# Patient Record
Sex: FEMALE | Race: BLACK OR AFRICAN AMERICAN
[De-identification: names, ages, dates, MRNs, and addresses within clinical notes are randomized per-mention and may not be internally consistent; named-entity substitution may affect disease eponyms.]

---

## 2017-12-15 ENCOUNTER — HOSPITAL ENCOUNTER (EMERGENCY)
Dept: HOSPITAL 72 - EMR | Age: 62
Discharge: HOME | End: 2017-12-15
Payer: MEDICARE

## 2017-12-15 VITALS — WEIGHT: 200 LBS | BODY MASS INDEX: 34.15 KG/M2 | HEIGHT: 64 IN

## 2017-12-15 VITALS — DIASTOLIC BLOOD PRESSURE: 65 MMHG | SYSTOLIC BLOOD PRESSURE: 133 MMHG

## 2017-12-15 VITALS — SYSTOLIC BLOOD PRESSURE: 133 MMHG | DIASTOLIC BLOOD PRESSURE: 65 MMHG

## 2017-12-15 DIAGNOSIS — R91.8: ICD-10-CM

## 2017-12-15 DIAGNOSIS — J44.9: ICD-10-CM

## 2017-12-15 DIAGNOSIS — I10: ICD-10-CM

## 2017-12-15 DIAGNOSIS — E03.9: ICD-10-CM

## 2017-12-15 DIAGNOSIS — R07.89: Primary | ICD-10-CM

## 2017-12-15 LAB
ALBUMIN/GLOB SERPL: 0.9 {RATIO} (ref 1–2.7)
ALT SERPL-CCNC: 25 U/L (ref 12–78)
ANION GAP SERPL CALC-SCNC: 5 MMOL/L (ref 5–15)
AST SERPL-CCNC: 22 U/L (ref 15–37)
BASOPHILS NFR BLD AUTO: 0.8 % (ref 0–2)
CALCIUM SERPL-MCNC: 9 MG/DL (ref 8.5–10.1)
CHLORIDE SERPL-SCNC: 102 MMOL/L (ref 98–107)
CK MB SERPL-MCNC: 1.5 NG/ML (ref 0–3.6)
CO2 SERPL-SCNC: 32 MMOL/L (ref 21–32)
CREAT SERPL-MCNC: 1 MG/DL (ref 0.55–1.3)
EOSINOPHIL NFR BLD AUTO: 1.9 % (ref 0–3)
ERYTHROCYTE [DISTWIDTH] IN BLOOD BY AUTOMATED COUNT: 13.6 % (ref 11.6–14.8)
GFR SERPLBLD BASED ON 1.73 SQ M-ARVRAT: > 60 ML/MIN (ref 60–?)
GLOBULIN SER-MCNC: 4.1 G/DL
LYMPHOCYTES NFR BLD AUTO: 19.7 % (ref 20–45)
MCH RBC QN AUTO: 25.1 PG (ref 27–31)
MCHC RBC AUTO-ENTMCNC: 30.6 G/DL (ref 32–36)
MCV RBC AUTO: 82 FL (ref 80–99)
MONOCYTES NFR BLD AUTO: 4.7 % (ref 1–10)
NEUTROPHILS NFR BLD AUTO: 73 % (ref 45–75)
PLATELET # BLD: 212 K/UL (ref 150–450)
PMV BLD AUTO: 7.3 FL (ref 6.5–10.1)
POTASSIUM SERPL-SCNC: 2.9 MMOL/L (ref 3.5–5.1)
PROT SERPL-MCNC: 7.6 G/DL (ref 6.4–8.2)
RBC # BLD AUTO: 4.51 M/UL (ref 4.2–5.4)
SODIUM SERPL-SCNC: 139 MMOL/L (ref 136–145)
WBC # BLD AUTO: 6.7 K/UL (ref 4.8–10.8)

## 2017-12-15 PROCEDURE — 71275 CT ANGIOGRAPHY CHEST: CPT

## 2017-12-15 PROCEDURE — 82553 CREATINE MB FRACTION: CPT

## 2017-12-15 PROCEDURE — 84484 ASSAY OF TROPONIN QUANT: CPT

## 2017-12-15 PROCEDURE — 82550 ASSAY OF CK (CPK): CPT

## 2017-12-15 PROCEDURE — 99284 EMERGENCY DEPT VISIT MOD MDM: CPT

## 2017-12-15 PROCEDURE — 36415 COLL VENOUS BLD VENIPUNCTURE: CPT

## 2017-12-15 PROCEDURE — 85025 COMPLETE CBC W/AUTO DIFF WBC: CPT

## 2017-12-15 PROCEDURE — 83880 ASSAY OF NATRIURETIC PEPTIDE: CPT

## 2017-12-15 PROCEDURE — 93005 ELECTROCARDIOGRAM TRACING: CPT

## 2017-12-15 PROCEDURE — 80053 COMPREHEN METABOLIC PANEL: CPT

## 2017-12-15 PROCEDURE — 71010: CPT

## 2017-12-15 PROCEDURE — 85379 FIBRIN DEGRADATION QUANT: CPT

## 2017-12-16 NOTE — DIAGNOSTIC IMAGING REPORT
Indication: Shortness of breath

 

Technique: XRAY Chest 1v

 

Comparison: None

 

Findings: Borderline cardiomegaly. Mediastinal contours are sharp. There is no focal

consolidation, pneumothorax or pleural effusion. Osseous structures demonstrate no

acute abnormality. Degenerative changes are seen in the thoracic spine.

 

Impression:

Borderline cardiomegaly. No radiographic evidence of acute cardiopulmonary disease.

## 2017-12-16 NOTE — DIAGNOSTIC IMAGING REPORT
Indication: Shortness of breath

 

Technique: CTA Chest w Contrast. CT angiogram of the chest was obtained utilizing

automated exposure control after bolus initiation of IV contrast. Axial, coronal and

sagittal reformats obtained. 3-D reconstructions were created on a stand-alone

workstation and submitted to PACS.

 

CT dose: Total DLP 1218.17 mGycm; CTDI vol 38.34 mGy

 

Comparison: None

 

Findings: 

There is adequate opacification of the pulmonary arteries. There is no pulmonary

embolism. The main pulmonary artery is normal in size. Thoracic aorta is normal in

caliber. No evidence of aortic dissection.

 

Dependent atelectasis noted posteriorly in the lower lobes. There is a 4 mm lung

nodule in the left upper lobe (series 8 image 29). 3 mm nodule is noted in the left

lung apex (series 8 image 15). There is a pleural-based nodule in the right upper

lobe (series 8 image 13. Additional solid nodule in the right lower lobe measures up

to 3.5 mm (series 8 image #46). There is no focal consolidation suggest pneumonia. No

pleural effusion or pneumothorax.

 

Heart is within upper limits for normal size. No pericardial effusion. No

pathologically enlarged adenopathy. Thyroid is unremarkable in appearance.

 

Images through the upper abdomen demonstrate questionable hepatomegaly and hepatic

steatosis. There is prominence of the common bile duct and central intrahepatic

ducts. If there is history of prior cholecystectomy, this may be postsurgical in

etiology. Nodular changes seen in the thoracic spine. No acute osseous abnormality is

seen.

 

Impression: 

No evidence of pulmonary embolism. No thoracic aortic aneurysm or dissection.

 

Question hepatomegaly and hepatic steatosis. Biliary ductal ectasia. If there is

history of cholecystectomy this may be postsurgical. The liver and gallbladder are

not completely evaluated. Correlate clinically. Consider dedicated imaging as

clinically indicated.

 

Multiple indeterminate pulmonary nodules. The largest one is solid and measures 5 mm.

For multiple nodules <6 mm in a patient of unknown risk, with the largest one being

solid, for a low-risk patient, recommend no follow-up. For a high-risk patient, CT at

12 months is optional with stronger consideration if there is suspicious nodule

morphology and/or upper lobe location. See citation below

 

This corresponds with the statrad preliminary report. 

 

The CT scanner at [facility] is accredited by the American College of

Radiology and the scans are performed using protocols designed to limit radiation

exposure to as low as reasonably achievable to attain images of sufficient resolution

adequate for diagnostic evaluation.

 

Amarilys JOHNSON et al. Guidelines for Management of Incidental Pulmonary Nodules Detected

on CT Images: From the Fleischner Society 2017. Radiology. 2017 Jul;284(1):228-243.

## 2017-12-17 ENCOUNTER — HOSPITAL ENCOUNTER (EMERGENCY)
Dept: HOSPITAL 72 - EMR | Age: 62
Discharge: HOME | End: 2017-12-17
Payer: MEDICARE

## 2017-12-17 VITALS — DIASTOLIC BLOOD PRESSURE: 52 MMHG | SYSTOLIC BLOOD PRESSURE: 112 MMHG

## 2017-12-17 VITALS — DIASTOLIC BLOOD PRESSURE: 82 MMHG | SYSTOLIC BLOOD PRESSURE: 122 MMHG

## 2017-12-17 VITALS — BODY MASS INDEX: 47.09 KG/M2 | HEIGHT: 66 IN | WEIGHT: 293 LBS

## 2017-12-17 VITALS — DIASTOLIC BLOOD PRESSURE: 80 MMHG | SYSTOLIC BLOOD PRESSURE: 120 MMHG

## 2017-12-17 DIAGNOSIS — I10: ICD-10-CM

## 2017-12-17 DIAGNOSIS — J44.9: Primary | ICD-10-CM

## 2017-12-17 DIAGNOSIS — R91.8: ICD-10-CM

## 2017-12-17 LAB
ALBUMIN/GLOB SERPL: 0.9 {RATIO} (ref 1–2.7)
ALT SERPL-CCNC: 25 U/L (ref 12–78)
ANION GAP SERPL CALC-SCNC: 7 MMOL/L (ref 5–15)
APTT BLD: 26 SEC (ref 23–33)
AST SERPL-CCNC: 14 U/L (ref 15–37)
BASOPHILS NFR BLD AUTO: 0.6 % (ref 0–2)
CALCIUM SERPL-MCNC: 9.3 MG/DL (ref 8.5–10.1)
CHLORIDE SERPL-SCNC: 101 MMOL/L (ref 98–107)
CK MB SERPL-MCNC: 0.5 NG/ML (ref 0–3.6)
CO2 SERPL-SCNC: 32 MMOL/L (ref 21–32)
CREAT SERPL-MCNC: 0.9 MG/DL (ref 0.55–1.3)
EOSINOPHIL NFR BLD AUTO: 1.2 % (ref 0–3)
ERYTHROCYTE [DISTWIDTH] IN BLOOD BY AUTOMATED COUNT: 13.8 % (ref 11.6–14.8)
GFR SERPLBLD BASED ON 1.73 SQ M-ARVRAT: > 60 ML/MIN (ref 60–?)
GLOBULIN SER-MCNC: 4 G/DL
INR PPP: 1 (ref 0.9–1.1)
LYMPHOCYTES NFR BLD AUTO: 19.4 % (ref 20–45)
MCH RBC QN AUTO: 25.8 PG (ref 27–31)
MCHC RBC AUTO-ENTMCNC: 31.5 G/DL (ref 32–36)
MCV RBC AUTO: 82 FL (ref 80–99)
MONOCYTES NFR BLD AUTO: 4.6 % (ref 1–10)
NEUTROPHILS NFR BLD AUTO: 74.3 % (ref 45–75)
PLATELET # BLD: 217 K/UL (ref 150–450)
PMV BLD AUTO: 7.6 FL (ref 6.5–10.1)
POTASSIUM SERPL-SCNC: 3.2 MMOL/L (ref 3.5–5.1)
PROT SERPL-MCNC: 7.4 G/DL (ref 6.4–8.2)
PROTHROMBIN TIME: 10.3 SEC (ref 9.3–11.5)
RBC # BLD AUTO: 4.57 M/UL (ref 4.2–5.4)
SODIUM SERPL-SCNC: 140 MMOL/L (ref 136–145)
WBC # BLD AUTO: 5.9 K/UL (ref 4.8–10.8)

## 2017-12-17 PROCEDURE — 93005 ELECTROCARDIOGRAM TRACING: CPT

## 2017-12-17 PROCEDURE — 94640 AIRWAY INHALATION TREATMENT: CPT

## 2017-12-17 PROCEDURE — 82553 CREATINE MB FRACTION: CPT

## 2017-12-17 PROCEDURE — 85610 PROTHROMBIN TIME: CPT

## 2017-12-17 PROCEDURE — 94664 DEMO&/EVAL PT USE INHALER: CPT

## 2017-12-17 PROCEDURE — 85730 THROMBOPLASTIN TIME PARTIAL: CPT

## 2017-12-17 PROCEDURE — 36415 COLL VENOUS BLD VENIPUNCTURE: CPT

## 2017-12-17 PROCEDURE — 84484 ASSAY OF TROPONIN QUANT: CPT

## 2017-12-17 PROCEDURE — 99284 EMERGENCY DEPT VISIT MOD MDM: CPT

## 2017-12-17 PROCEDURE — 83880 ASSAY OF NATRIURETIC PEPTIDE: CPT

## 2017-12-17 PROCEDURE — 71010: CPT

## 2017-12-17 PROCEDURE — 85025 COMPLETE CBC W/AUTO DIFF WBC: CPT

## 2017-12-17 PROCEDURE — 80053 COMPREHEN METABOLIC PANEL: CPT

## 2017-12-17 PROCEDURE — 82550 ASSAY OF CK (CPK): CPT

## 2017-12-17 NOTE — CARDIOLOGY REPORT
--------------- APPROVED REPORT --------------





EKG Measurement

Heart [id number]

DC [id number]

[id number]

[id number]

[id number]





Normal sinus rhythm

Normal ECG

## 2017-12-18 NOTE — EMERGENCY ROOM REPORT
History of Present Illness


General


Chief Complaint:  Chest Pain


Source:  Patient





Present Illness


HPI


Patient is a 62-year-old female who presented after increased chest pain.  

Patient reports having similar symptoms.  The patient recently been seen by me 

after similar episode.  Patient was having improvement in the pain after using 

inhaler.  She denies any fever.  She reports having use of oxygen at home.  She 

states that she may be having some similar symptoms at this time due to 

increased stress do to her  dying approximately a year ago.  She denies 

any suicidal thoughts.  She denied productive cough or leg swelling.


Allergies:  


Coded Allergies:  


     ASPIRIN (Verified  Allergy, Unknown, 7/16/15)





Patient History


Past Medical History:  see triage record


Reviewed Nursing Documentation:  PMH: Agreed, PSxH: Agreed





Nursing Documentation-PMH


Hx Cardiac Problems:  Yes - High cholesterol


Hx Hypertension:  Yes


Hx COPD:  Yes


Hx Gastrointestinal Problems:  Yes - Hep C





Review of Systems


All Other Systems:  negative except mentioned in HPI





Physical Exam





Vital Signs








  Date Time  Temp Pulse Resp B/P (MAP) Pulse Ox O2 Delivery O2 Flow Rate FiO2


 


12/17/17 12:52 97.5 98 20 132/74 98 Room Air  


 


12/17/17 13:38        21








Sp02 EP Interpretation:  reviewed, normal


General Appearance:  normal inspection, well appearing, no apparent distress, 

alert, GCS 15


Head:  atraumatic


ENT:  normal ENT inspection, hearing grossly normal, normal voice


Neck:  normal inspection, full range of motion, supple, no bony tend


Respiratory:  normal inspection, lungs clear, normal breath sounds, no 

respiratory distress, no retraction, no wheezing


Cardiovascular #1:  regular rate, rhythm, no edema


Gastrointestinal:  normal inspection, normal bowel sounds, non tender, soft, no 

guarding, no hernia


Genitourinary:  no CVA tenderness


Musculoskeletal:  normal inspection, back normal, normal range of motion


Neurologic:  normal inspection, alert, oriented x3, responsive, CNs III-XII nml 

as tested, motor strength/tone normal, speech normal


Psychiatric:  normal inspection, judgement/insight normal, mood/affect normal


Skin:  normal inspection, normal color, no rash





Medical Decision Making


Diagnostic Impression:  


 Primary Impression:  


 COPD (chronic obstructive pulmonary disease)


 Additional Impression:  


 Lung nodule, multiple


ER Course


Patient presented for chest pain.  Differential diagnosis included but was not 

limited to acute coronary syndrome, pulmonary embolism, pneumonia, aortic 

dissection, shingles, pneumothorax, aortic dissection, esophageal rupture, 

pericarditis. Because of complexity of patient's case laboratory testing and 

imaging studies were ordered.


Patient given breathing treatment with improvement in her symptoms.  The chest x

-ray was unremarkable .  The patient is advised to follow up with  primary care 

doctor in 1-2 days.  The patient given prescription for prednisone.   Patient 

is advised to return if any worsening condition or if any changes in status 

that are concerning.





This report is dictated with Dragon transcription software which may 

occasionally lead to discrepancies related to use of this software.





Labs








Test


  12/17/17


14:10


 


White Blood Count


  5.9 K/UL


(4.8-10.8)


 


Red Blood Count


  4.57 M/UL


(4.20-5.40)


 


Hemoglobin


  11.8 G/DL


(12.0-16.0)


 


Hematocrit


  37.5 %


(37.0-47.0)


 


Mean Corpuscular Volume 82 FL (80-99) 


 


Mean Corpuscular Hemoglobin


  25.8 PG


(27.0-31.0)


 


Mean Corpuscular Hemoglobin


Concent 31.5 G/DL


(32.0-36.0)


 


Red Cell Distribution Width


  13.8 %


(11.6-14.8)


 


Platelet Count


  217 K/UL


(150-450)


 


Mean Platelet Volume


  7.6 FL


(6.5-10.1)


 


Neutrophils (%) (Auto)


  74.3 %


(45.0-75.0)


 


Lymphocytes (%) (Auto)


  19.4 %


(20.0-45.0)


 


Monocytes (%) (Auto)


  4.6 %


(1.0-10.0)


 


Eosinophils (%) (Auto)


  1.2 %


(0.0-3.0)


 


Basophils (%) (Auto)


  0.6 %


(0.0-2.0)


 


Prothrombin Time


  10.3 SEC


(9.30-11.50)


 


Prothromb Time International


Ratio 1.0 (0.9-1.1) 


 


 


Activated Partial


Thromboplast Time 26 SEC (23-33) 


 


 


Sodium Level


  140 MMOL/L


(136-145)


 


Potassium Level


  3.2 MMOL/L


(3.5-5.1)


 


Chloride Level


  101 MMOL/L


()


 


Carbon Dioxide Level


  32 MMOL/L


(21-32)


 


Anion Gap


  7 mmol/L


(5-15)


 


Blood Urea Nitrogen


  11 mg/dL


(7-18)


 


Creatinine


  0.9 MG/DL


(0.55-1.30)


 


Estimat Glomerular Filtration


Rate > 60 mL/min


(>60)


 


Glucose Level


  157 MG/DL


()


 


Calcium Level


  9.3 MG/DL


(8.5-10.1)


 


Total Bilirubin


  0.3 MG/DL


(0.2-1.0)


 


Aspartate Amino Transf


(AST/SGOT) 14 U/L (15-37) 


 


 


Alanine Aminotransferase


(ALT/SGPT) 25 U/L (12-78) 


 


 


Alkaline Phosphatase


  64 U/L


()


 


Total Creatine Kinase


  68 U/L


()


 


Creatine Kinase MB


  0.5 NG/ML


(0.0-3.6)


 


Creatine Kinase MB Relative


Index 0.7 


 


 


Troponin I


  0.000 ng/mL


(0.000-0.056)


 


Pro-B-Type Natriuretic Peptide


  23 pg/mL


(0-125)


 


Total Protein


  7.4 G/DL


(6.4-8.2)


 


Albumin


  3.4 G/DL


(3.4-5.0)


 


Globulin 4.0 g/dL 


 


Albumin/Globulin Ratio 0.9 (1.0-2.7) 











Last Vital Signs








  Date Time  Temp Pulse Resp B/P (MAP) Pulse Ox O2 Delivery O2 Flow Rate FiO2


 


12/17/17 16:13 97.5 93 21 122/82 100 Room Air  21








Status:  improved


Disposition:  HOME, SELF-CARE


Condition:  Stable


Scripts


Prednisone* (PREDNISONE*) 20 Mg Tablet


40 MG ORAL DAILY, #10 TAB


   Prov: Efrain Valdez         12/17/17


Referrals:  


NOT CHOSEN [facility]/MD,REFERRING (PCP)


Patient Instructions:  Nonspecific Chest Pain











Efrain Valdez Dec 18, 2017 20:22

## 2017-12-18 NOTE — EMERGENCY ROOM REPORT
History of Present Illness


General


Chief Complaint:  Chest Pain


Source:  Patient, EMS





Present Illness


HPI


Patient is a 62-year-old female who presented after increased chest pain.  This 

had been present for approximately one month.  Patient gradual onset of 

symptoms.  Patient was having some increased difficulty breathing.  She had 

been having increased nonproductive cough.  She reports having some improvement 

with her inhalers.  She had prior history of COPD.  As reported having some 

increased discomfort.  She reportedly uses oxygen 2 L at home.  She denied any 

fever.


Allergies:  


Coded Allergies:  


     ASPIRIN (Verified  Allergy, Unknown, 7/16/15)





Patient History


Past Medical History:  see triage record


Reviewed Nursing Documentation:  PMH: Agreed, PSxH: Agreed





Nursing Documentation-PMH


Past Medical History:  No History, Except For


Hx Cardiac Problems:  No - HYPOTHYROIDS


Hx Hypertension:  Yes


Hx COPD:  Yes


Hx Gastrointestinal Problems:  Yes - Hep C





Review of Systems


All Other Systems:  negative except mentioned in HPI





Physical Exam





Vital Signs








  Date Time  Temp Pulse Resp B/P (MAP) Pulse Ox O2 Delivery O2 Flow Rate FiO2


 


12/15/17 18:03 99.3 80 16 128/70 99 Room Air  


 


12/15/17 18:30        96








Sp02 EP Interpretation:  reviewed, normal


General Appearance:  normal inspection, well appearing, no apparent distress, 

alert, GCS 15


Head:  atraumatic


ENT:  normal ENT inspection, hearing grossly normal, normal voice


Neck:  normal inspection, full range of motion, supple, no bony tend


Respiratory:  normal inspection, lungs clear, no respiratory distress, no 

retraction, wheezing


Cardiovascular #1:  regular rate, rhythm, no edema


Gastrointestinal:  normal inspection, normal bowel sounds, non tender, soft, no 

guarding, no hernia


Genitourinary:  no CVA tenderness


Musculoskeletal:  normal inspection, back normal, normal range of motion


Neurologic:  normal inspection, alert, oriented x3, responsive, CNs III-XII nml 

as tested, speech normal


Psychiatric:  normal inspection, judgement/insight normal, mood/affect normal


Skin:  normal inspection, normal color, no rash





Medical Decision Making


Diagnostic Impression:  


 Primary Impression:  


 Muscular pain


 Additional Impression:  


 COPD (chronic obstructive pulmonary disease)


ER Course


.Patient presented for chest pain.Differential diagnosis included but was not 

limited to acute coronary syndrome, pulmonary embolism, pneumonia, aortic 

dissection, shingles, pneumothorax, aortic dissection, esophageal rupture, 

pericarditis. Because of complexity of patient's case laboratory testing and 

imaging studies were ordered.CT of the chest was ordered of the patient's 

complaints. laboratory studies were unremarkable.  CTA read by radiology showed 

multiple lung nodules.  There is no evidence of pulmonary embolism or aortic 

dissection noted.  The patient was given breathing treatments with improvement 

in her pain and discomfort.  The patient is advised to follow up with  primary 

care doctor in 1-2 days.  Patient is advised to return if any worsening 

condition or if any changes in status that are concerning.





This report is dictated with Dragon transcription software which may 

occasionally lead to discrepancies related to use of this software.





Labs








Test


  12/15/17


18:39


 


White Blood Count


  6.7 K/UL


(4.8-10.8)


 


Red Blood Count


  4.51 M/UL


(4.20-5.40)


 


Hemoglobin


  11.3 G/DL


(12.0-16.0)


 


Hematocrit


  36.9 %


(37.0-47.0)


 


Mean Corpuscular Volume 82 FL (80-99) 


 


Mean Corpuscular Hemoglobin


  25.1 PG


(27.0-31.0)


 


Mean Corpuscular Hemoglobin


Concent 30.6 G/DL


(32.0-36.0)


 


Red Cell Distribution Width


  13.6 %


(11.6-14.8)


 


Platelet Count


  212 K/UL


(150-450)


 


Mean Platelet Volume


  7.3 FL


(6.5-10.1)


 


Neutrophils (%) (Auto)


  73.0 %


(45.0-75.0)


 


Lymphocytes (%) (Auto)


  19.7 %


(20.0-45.0)


 


Monocytes (%) (Auto)


  4.7 %


(1.0-10.0)


 


Eosinophils (%) (Auto)


  1.9 %


(0.0-3.0)


 


Basophils (%) (Auto)


  0.8 %


(0.0-2.0)


 


D-Dimer


  0.52 mg/L FEU


(0.00-0.49)


 


Sodium Level


  139 MMOL/L


(136-145)


 


Potassium Level


  2.9 MMOL/L


(3.5-5.1)


 


Chloride Level


  102 MMOL/L


()


 


Carbon Dioxide Level


  32 MMOL/L


(21-32)


 


Anion Gap


  5 mmol/L


(5-15)


 


Blood Urea Nitrogen


  12 mg/dL


(7-18)


 


Creatinine


  1.0 MG/DL


(0.55-1.30)


 


Estimat Glomerular Filtration


Rate > 60 mL/min


(>60)


 


Glucose Level


  162 MG/DL


()


 


Calcium Level


  9.0 MG/DL


(8.5-10.1)


 


Total Bilirubin


  0.3 MG/DL


(0.2-1.0)


 


Aspartate Amino Transf


(AST/SGOT) 22 U/L (15-37) 


 


 


Alanine Aminotransferase


(ALT/SGPT) 25 U/L (12-78) 


 


 


Alkaline Phosphatase


  62 U/L


()


 


Total Creatine Kinase


  81 U/L


()


 


Creatine Kinase MB


  1.5 NG/ML


(0.0-3.6)


 


Creatine Kinase MB Relative


Index 1.8 


 


 


Troponin I


  0.000 ng/mL


(0.000-0.056)


 


Pro-B-Type Natriuretic Peptide


  14 pg/mL


(0-125)


 


Total Protein


  7.6 G/DL


(6.4-8.2)


 


Albumin


  3.5 G/DL


(3.4-5.0)


 


Globulin 4.1 g/dL 


 


Albumin/Globulin Ratio 0.9 (1.0-2.7) 











Last Vital Signs








  Date Time  Temp Pulse Resp B/P (MAP) Pulse Ox O2 Delivery O2 Flow Rate FiO2


 


12/15/17 22:27 99.3 75 17 133/65 96 Room Air  96








Status:  improved


Disposition:  HOME, SELF-CARE


Condition:  Stable


Scripts


Albuterol Sulfate* (ALBUTEROL SULFATE MDI*) 8.5 Gm Hfa.aer.ad


2 PUFF INH Q4H, #1 INH 0 Refills


   Prov: Efrain Valdez         12/15/17


Patient Instructions:  Nonspecific Chest Pain











Efrain Valdez Dec 18, 2017 20:19

## 2017-12-18 NOTE — DIAGNOSTIC IMAGING REPORT
Indication: Dyspnea

 

Comparison:  12/15/2017

 

A single view chest radiograph was obtained.

 

Findings:

 

Bones are osteopenic. Cardiac silhouette is prominent but stable. Pulmonary

vascularity is somewhat prominent but stable.

 

IMPRESSION:

 

No acute disease

## 2017-12-23 NOTE — CARDIOLOGY REPORT
--------------- APPROVED REPORT --------------





EKG Measurement

Heart [id number]

TX [id number]

[id number]

[id number]

[id number]





Sinus rhythm with 1st degree AV block

Otherwise normal ECG

## 2019-06-22 ENCOUNTER — HOSPITAL ENCOUNTER (EMERGENCY)
Dept: HOSPITAL 72 - EMR | Age: 64
LOS: 1 days | Discharge: ANOTHER HOSPITAL | End: 2019-06-23
Payer: MEDICARE

## 2019-06-22 VITALS — DIASTOLIC BLOOD PRESSURE: 70 MMHG | SYSTOLIC BLOOD PRESSURE: 134 MMHG

## 2019-06-22 VITALS — HEIGHT: 66 IN | WEIGHT: 290 LBS | BODY MASS INDEX: 46.61 KG/M2

## 2019-06-22 VITALS — SYSTOLIC BLOOD PRESSURE: 111 MMHG | DIASTOLIC BLOOD PRESSURE: 84 MMHG

## 2019-06-22 VITALS — DIASTOLIC BLOOD PRESSURE: 80 MMHG | SYSTOLIC BLOOD PRESSURE: 142 MMHG

## 2019-06-22 VITALS — SYSTOLIC BLOOD PRESSURE: 144 MMHG | DIASTOLIC BLOOD PRESSURE: 73 MMHG

## 2019-06-22 DIAGNOSIS — Z90.710: ICD-10-CM

## 2019-06-22 DIAGNOSIS — Z88.6: ICD-10-CM

## 2019-06-22 DIAGNOSIS — E03.9: ICD-10-CM

## 2019-06-22 DIAGNOSIS — Z79.84: ICD-10-CM

## 2019-06-22 DIAGNOSIS — R10.9: ICD-10-CM

## 2019-06-22 DIAGNOSIS — E11.9: ICD-10-CM

## 2019-06-22 DIAGNOSIS — I10: ICD-10-CM

## 2019-06-22 DIAGNOSIS — J44.9: ICD-10-CM

## 2019-06-22 DIAGNOSIS — F11.23: Primary | ICD-10-CM

## 2019-06-22 DIAGNOSIS — I45.10: ICD-10-CM

## 2019-06-22 DIAGNOSIS — B19.20: ICD-10-CM

## 2019-06-22 DIAGNOSIS — R11.10: ICD-10-CM

## 2019-06-22 DIAGNOSIS — K76.0: ICD-10-CM

## 2019-06-22 LAB
ADD MANUAL DIFF: NO
ALBUMIN SERPL-MCNC: 3.5 G/DL (ref 3.4–5)
ALBUMIN/GLOB SERPL: 0.8 {RATIO} (ref 1–2.7)
ALP SERPL-CCNC: 73 U/L (ref 46–116)
ALT SERPL-CCNC: 20 U/L (ref 12–78)
ANION GAP SERPL CALC-SCNC: 12 MMOL/L (ref 5–15)
APPEARANCE UR: CLEAR
APTT PPP: (no result) S
AST SERPL-CCNC: 19 U/L (ref 15–37)
BASOPHILS NFR BLD AUTO: 1.1 % (ref 0–2)
BILIRUB SERPL-MCNC: 0.4 MG/DL (ref 0.2–1)
BUN SERPL-MCNC: 7 MG/DL (ref 7–18)
CALCIUM SERPL-MCNC: 10.2 MG/DL (ref 8.5–10.1)
CHLORIDE SERPL-SCNC: 104 MMOL/L (ref 98–107)
CK MB SERPL-MCNC: 0.7 NG/ML (ref 0–3.6)
CO2 SERPL-SCNC: 28 MMOL/L (ref 21–32)
CREAT SERPL-MCNC: 0.9 MG/DL (ref 0.55–1.3)
EOSINOPHIL NFR BLD AUTO: 0.6 % (ref 0–3)
ERYTHROCYTE [DISTWIDTH] IN BLOOD BY AUTOMATED COUNT: 12.5 % (ref 11.6–14.8)
GLOBULIN SER-MCNC: 4.5 G/DL
GLUCOSE UR STRIP-MCNC: NEGATIVE MG/DL
HCT VFR BLD CALC: 38.2 % (ref 37–47)
HGB BLD-MCNC: 12.9 G/DL (ref 12–16)
KETONES UR QL STRIP: NEGATIVE
LEUKOCYTE ESTERASE UR QL STRIP: (no result)
LYMPHOCYTES NFR BLD AUTO: 10.7 % (ref 20–45)
MCV RBC AUTO: 75 FL (ref 80–99)
MONOCYTES NFR BLD AUTO: 4.3 % (ref 1–10)
NEUTROPHILS NFR BLD AUTO: 83.2 % (ref 45–75)
NITRITE UR QL STRIP: NEGATIVE
PH UR STRIP: 8 [PH] (ref 4.5–8)
PLATELET # BLD: 302 K/UL (ref 150–450)
POTASSIUM SERPL-SCNC: 3.2 MMOL/L (ref 3.5–5.1)
PROT UR QL STRIP: (no result)
RBC # BLD AUTO: 5.13 M/UL (ref 4.2–5.4)
SODIUM SERPL-SCNC: 144 MMOL/L (ref 136–145)
SP GR UR STRIP: 1.01 (ref 1–1.03)
UROBILINOGEN UR-MCNC: NORMAL MG/DL (ref 0–1)
WBC # BLD AUTO: 9 K/UL (ref 4.8–10.8)

## 2019-06-22 PROCEDURE — 83690 ASSAY OF LIPASE: CPT

## 2019-06-22 PROCEDURE — 96361 HYDRATE IV INFUSION ADD-ON: CPT

## 2019-06-22 PROCEDURE — 96372 THER/PROPH/DIAG INJ SC/IM: CPT

## 2019-06-22 PROCEDURE — 74176 CT ABD & PELVIS W/O CONTRAST: CPT

## 2019-06-22 PROCEDURE — 99285 EMERGENCY DEPT VISIT HI MDM: CPT

## 2019-06-22 PROCEDURE — 84484 ASSAY OF TROPONIN QUANT: CPT

## 2019-06-22 PROCEDURE — 96375 TX/PRO/DX INJ NEW DRUG ADDON: CPT

## 2019-06-22 PROCEDURE — 96374 THER/PROPH/DIAG INJ IV PUSH: CPT

## 2019-06-22 PROCEDURE — 80053 COMPREHEN METABOLIC PANEL: CPT

## 2019-06-22 PROCEDURE — 80329 ANALGESICS NON-OPIOID 1 OR 2: CPT

## 2019-06-22 PROCEDURE — 36415 COLL VENOUS BLD VENIPUNCTURE: CPT

## 2019-06-22 PROCEDURE — 81003 URINALYSIS AUTO W/O SCOPE: CPT

## 2019-06-22 PROCEDURE — 80307 DRUG TEST PRSMV CHEM ANLYZR: CPT

## 2019-06-22 PROCEDURE — 85025 COMPLETE CBC W/AUTO DIFF WBC: CPT

## 2019-06-22 PROCEDURE — 71045 X-RAY EXAM CHEST 1 VIEW: CPT

## 2019-06-22 PROCEDURE — 93005 ELECTROCARDIOGRAM TRACING: CPT

## 2019-06-22 PROCEDURE — 82553 CREATINE MB FRACTION: CPT

## 2019-06-22 NOTE — NUR
ED Nurse Note:

Received report from FRANCISCO Rodgers. Pt awake and c/o abdominal pain 8/10. VSS. Will 
carry out ER MD's orders and instructions for discharge. Will continue to 
monitor.

## 2019-06-22 NOTE — NUR
ED Nurse Note:



Pt BIBA from home due to medial abdominal pain with n/v/d x 2 days, admitted "I 
shoot heroin a couple days ago". Pain 10/10. Last bowel movement was this 
morning. Emesis was bile and fluid. AOx4, VSS. Will cont to monitor.

## 2019-06-22 NOTE — EMERGENCY ROOM REPORT
History of Present Illness


General


Chief Complaint:  Nausea, Vomiting, and Diarrhea


Source:  Patient


 (Hilary Adkins)





Present Illness


HPI


63-year-old female with history of uncontrolled diabetes  here complaining of 2 

days of nonbloody diarrhea and 1 day of multiple bouts of nonbloody emesis.  

Patient also complains of 6 out of 10 epigastric and left upper quadrant pain 

x2 days.  Patient reports that started any new medication as well as recent 

travel.  Patient reports that about a year ago she was first diagnosed with 

diabetes however noncompliant with taking her metformin all the time.  Denies 

history of alcohol intake, drug use, smoking.  Denies recent URI, fever and 

chills.  Patient is actively vomiting and lethargic.  Denies chest pain, 

palpitation, shortness of breath, urinary frequency, dizziness, headache, 

blurry vision, and all other associated symptoms.


 (Hilary Adkins)


Allergies:  


Coded Allergies:  


     ASPIRIN (Verified  Allergy, Unknown, 7/16/15)





Patient History


Past Medical History:  see triage record


Past Surgical History:  unable to obtain


Pertinent Family History:  none


Pregnant Now:  No


Immunizations:  UTD


Reviewed Nursing Documentation:  PMH: Agreed; PSxH: Agreed (Hilary Adkins)





Nursing Documentation-PMH


Past Medical History:  No History, Except For


Hx Cardiac Problems:  No - HYPOTHYROIDS


Hx Hypertension:  Yes


Hx COPD:  Yes


Hx Gastrointestinal Problems:  Yes - Hep C


 (Hilary Adkins)





Review of Systems


All Other Systems:  negative except mentioned in HPI


 (Hilary Adkins)





Physical Exam





Vital Signs








  Date Time  Temp Pulse Resp B/P (MAP) Pulse Ox O2 Delivery O2 Flow Rate FiO2


 


6/22/19 16:36 98.2 69 16 135/70 (91) 96 Room Air  








Sp02 EP Interpretation:  reviewed, normal


General Appearance:  alert, GCS 15, mild distress


Head:  normocephalic, atraumatic


Eyes:  bilateral eye normal inspection, bilateral eye PERRL


ENT:  normal ENT inspection, hearing grossly normal, normal pharynx, no 

angioedema


Neck:  normal inspection, full range of motion, supple, thyroid normal, no 

carotid bruits


Respiratory:  normal inspection, chest non-tender, lungs clear, normal breath 

sounds, no rhonchi, no wheezing, respiratory distress


Cardiovascular #1:  normal inspection, normal peripheral pulses, regular rate, 

rhythm, no edema, no gallop, no murmur


Gastrointestinal:  no mass, non-distended, no hernia, no pulsatile mass, no 

rebound, guarding - Epigastric and left upper quadrant, other - Negative 

McBurney's and Rovsing's negative Mars's


Rectal:  deferred


Genitourinary:  no CVA tenderness


Musculoskeletal:  normal inspection, back normal


Neurologic:  normal inspection, alert, oriented x3, responsive, CNs III-XII nml 

as tested


Psychiatric:  normal inspection, judgement/insight normal


Skin:  normal inspection, normal color, no rash, warm/dry, well hydrated


Lymphatic:  normal inspection, no adenopathy


 (Hilary Adkins)





Medical Decision Making


PA Attestation


All my diagnosis and treatment plans were reviewed ad discussed with my 

supervising physician Dr. Valdez


 (Hilary Adkins)


Diagnostic Impression:  


 Primary Impression:  


 Opiate withdrawal


 Additional Impressions:  


 Abdominal pain


 Incomplete RBBB


 Intractable vomiting


ER Course


63-year-old female with history of uncontrolled diabetes  here complaining of 2 

days of nonbloody diarrhea and 1 day of multiple bouts of nonbloody emesis.  

Patient also complains of 6 out of 10 epigastric and left upper quadrant pain 

x2 days.  Patient reports that started any new medication as well as recent 

travel.  Patient reports that about a year ago she was first diagnosed with 

diabetes however noncompliant with taking her metformin all the time.  Denies 

history of alcohol intake, drug use, smoking.  Denies recent URI, fever and 

chills.  Patient is actively vomiting and lethargic.  Denies chest pain, 

palpitation, shortness of breath, urinary frequency, dizziness, headache, 

blurry vision, and all other associated symptoms.





Ddx considered but are not limited to: appendicitis, cholycisitis, gastritis, 

gasthroentritis, UTI, pylonephritis, SBO, diverticulitis, influenza with GI 

manifestation, MI, abdominal pain due to opiate withdrawal 











Vital signs: are WNL, pt. is afebrile








 H&PE are most consistent with: opiate withdrawal and abdominal pain














ORDERS: abdominal CT, abdominal pain set, EKG, abdominal US, Zofran, Toradol, 

Pepcid, NS bolus, ativan, reglan 














ED INTERVENTIONS: NS bolus, Zofran, Toradol, Pepcid























Patient was admited with diagnosis of    opiate withdrawal and abdominal pain   

to         under supervision of : José Miguel








pt stable at time of admission


glucose 185


 (Hilary Adkins)


ER Course


Patient is a 63-year-old female presented after increased abdominal pain and 

vomiting.  Patient reports having multiple episodes of vomiting and diarrhea.  

Patient states that she had recently stopped using heroin approximately 2 days 

ago.  She is type II diabetic and takes metformin.  She reports having multiple 

episodes of emesis as well as diarrhea as well as generalized weakness.  

Patient reports having previous use of methadone.  Patient was noted to have 

some prior history of COPD.Laboratory testing was notable for hypokalemia.  EKG 

showed normal sinus rhythm with a rate of 66 with an incomplete right bundle 

branch block and nonspecific T wave changes.Patient will be hospitalized due to 

persistent vomiting and generalized weakness.





Labs








Test


  6/22/19


17:15 6/22/19


18:20


 


White Blood Count


  9.0 K/UL


(4.8-10.8) 


 


 


Red Blood Count


  5.13 M/UL


(4.20-5.40) 


 


 


Hemoglobin


  12.9 G/DL


(12.0-16.0) 


 


 


Hematocrit


  38.2 %


(37.0-47.0) 


 


 


Mean Corpuscular Volume 75 FL (80-99)  


 


Mean Corpuscular Hemoglobin


  25.2 PG


(27.0-31.0) 


 


 


Mean Corpuscular Hemoglobin


Concent 33.8 G/DL


(32.0-36.0) 


 


 


Red Cell Distribution Width


  12.5 %


(11.6-14.8) 


 


 


Platelet Count


  302 K/UL


(150-450) 


 


 


Mean Platelet Volume


  6.4 FL


(6.5-10.1) 


 


 


Neutrophils (%) (Auto)


  83.2 %


(45.0-75.0) 


 


 


Lymphocytes (%) (Auto)


  10.7 %


(20.0-45.0) 


 


 


Monocytes (%) (Auto)


  4.3 %


(1.0-10.0) 


 


 


Eosinophils (%) (Auto)


  0.6 %


(0.0-3.0) 


 


 


Basophils (%) (Auto)


  1.1 %


(0.0-2.0) 


 


 


Sodium Level


  144 MMOL/L


(136-145) 


 


 


Potassium Level


  3.2 MMOL/L


(3.5-5.1) 


 


 


Chloride Level


  104 MMOL/L


() 


 


 


Carbon Dioxide Level


  28 MMOL/L


(21-32) 


 


 


Anion Gap


  12 mmol/L


(5-15) 


 


 


Blood Urea Nitrogen 7 mg/dL (7-18)  


 


Creatinine


  0.9 MG/DL


(0.55-1.30) 


 


 


Estimat Glomerular Filtration


Rate > 60 mL/min


(>60) 


 


 


Glucose Level


  185 MG/DL


() 


 


 


Calcium Level


  10.2 MG/DL


(8.5-10.1) 


 


 


Total Bilirubin


  0.4 MG/DL


(0.2-1.0) 


 


 


Aspartate Amino Transf


(AST/SGOT) 19 U/L (15-37) 


  


 


 


Alanine Aminotransferase


(ALT/SGPT) 20 U/L (12-78) 


  


 


 


Alkaline Phosphatase


  73 U/L


() 


 


 


Creatine Kinase MB


  0.7 NG/ML


(0.0-3.6) 


 


 


Troponin I


  0.000 ng/mL


(0.000-0.056) 


 


 


Total Protein


  8.0 G/DL


(6.4-8.2) 


 


 


Albumin


  3.5 G/DL


(3.4-5.0) 


 


 


Globulin 4.5 g/dL  


 


Albumin/Globulin Ratio 0.8 (1.0-2.7)  


 


Lipase


  78 U/L


() 


 


 


Serum Alcohol < 3 mg/dL  


 


Urine Color  Pale yellow 


 


Urine Appearance  Clear 


 


Urine pH  8 (4.5-8.0) 


 


Urine Specific Gravity


  


  1.010


(1.005-1.035)


 


Urine Protein  1+ (NEGATIVE) 


 


Urine Glucose (UA)


  


  Negative


(NEGATIVE)


 


Urine Ketones


  


  Negative


(NEGATIVE)


 


Urine Blood


  


  Negative


(NEGATIVE)


 


Urine Nitrite


  


  Negative


(NEGATIVE)


 


Urine Bilirubin


  


  Negative


(NEGATIVE)


 


Urine Urobilinogen


  


  Normal MG/DL


(0.0-1.0)


 


Urine Leukocyte Esterase  2+ (NEGATIVE) 


 


Urine RBC  0 /HPF (0 - 2) 


 


Urine WBC


  


  2-4 /HPF (0 -


2)


 


Urine Squamous Epithelial


Cells 


  Occasional


/LPF


 


Urine Bacteria


  


  Occasional


/HPF (NONE)


 


Urine Mucus


  


  Occasional


/LPF


 


Urine Opiates Screen


  


  Positive


(NEGATIVE)


 


Urine Barbiturates Screen


  


  Negative


(NEGATIVE)


 


Phencyclidine (PCP) Screen


  


  Negative


(NEGATIVE)


 


Urine Amphetamines Screen


  


  Negative


(NEGATIVE)


 


Urine Benzodiazepines Screen


  


  Positive


(NEGATIVE)


 


Urine Cocaine Screen


  


  Negative


(NEGATIVE)


 


Urine Marijuana (THC) Screen


  


  Negative


(NEGATIVE)








 (Efrain Valdez MD)


EKG Diagnostic Results


Rate:  normal


Rhythm:  NSR


ST Segments:  no acute changes


 (Hilary Adkins)


Rate:  normal


Rhythm:  NSR


ST Segments:  no acute changes


 (Efrain Valdez MD)





Chest X-Ray Diagnostic Results


Chest X-Ray Diagnostic Results :  


   Chest X-Ray Ordered:  Yes


   # of Views/Limited/Complete:  1 View


   Indication:  Other


   EP Interpretation:  Yes


   PA Xray:  Interpretation reviewed, by supervising MD, and agrees with 

findings.


   Interpretation:  no consolidation, no effusion, no pneumothorax


   Impression:  Other - cardiomegaly and vascular congestion


   Electronically Signed by:  hilary CANNON Scribe Text


Cardiomegaly and vascular congestion


 (Hilary Adkins)





CT/MRI/US Diagnostic Results


CT/MRI/US Diagnostic Results :  


   Imaging Test Ordered:  CT abdomen no contrast


   Impression


COMPARISON:


No relevant prior studies available.





FINDINGS:


Lung bases: Unremarkable.





ABDOMEN:


Liver: Mild fatty liver.


Gallbladder and bile ducts: No calcified stones. No ductal dilation.


Pancreas: Unremarkable.


Spleen: Unremarkable.


Adrenals: Unremarkable.


Kidneys and ureters: Bilateral renal hypodensities, largest measures 19 mm in 

the right kidney No hydronephrosis or perinephric collection. 


Stomach and bowel: Colon is collapsed and not well assessed. Correlate 

clinically if there is any concern for a colitis.





PELVIS:


Appendix: No findings to suggest acute appendicitis.


Bladder: Unremarkable.


Reproductive: Hysterectomy.





ABDOMEN and PELVIS:


Intraperitoneal space: Unremarkable.


Bones/joints: No acute fracture.


Soft tissues: Unremarkable.


Vasculature: Unremarkable. No abdominal aortic aneurysm.


Lymph nodes: No enlarged lymph nodes.





IMPRESSION: 


 (Hilary Adkins)





Last Vital Signs








  Date Time  Temp Pulse Resp B/P (MAP) Pulse Ox O2 Delivery O2 Flow Rate FiO2


 


6/22/19 16:36 98.2 69 16 135/70 (91) 96 Room Air  








 (Hilary Adkins)


Status:  unchanged


 (Efrain Valdez MD)


Disposition:  ADMITTED AS INPATIENT


Condition:  Stable


Scripts


Loperamide Hcl (IMODIUM A-D) 1 Mg/7.5 Ml Liquid


1 MG PO DAILY, #100 ML


   Prov: Hilary Adkins         6/22/19 


Ondansetron (Zofran) 4 Mg Tablet


4 MG ORAL Q6H PRN for Nausea & Vomiting, #20 TAB


   Prov: Hilary Adkins         6/22/19


Patient Instructions:  Opioid Use Disorder





Additional Instructions:  


Follow-up with your pain management doctor for management of your opiate 

withdrawal take medication as directed you are currently on diazepam that 

should help you with opiate withdrawal symptoms











Hilary Adkins Jun 22, 2019 17:03


Efrain Valdez MD Jun 22, 2019 20:53

## 2019-06-22 NOTE — DIAGNOSTIC IMAGING REPORT
EXAM:

  XR Chest, 1 View

 

CLINICAL HISTORY:

  ABD PAIN

 

TECHNIQUE:

  Frontal view of the chest.

 

COMPARISON:

  No relevant prior studies available.

 

FINDINGS:

  Lungs:  No consolidation.

  Pleural space:  Unremarkable.  No pneumothorax.

  Heart:  Cardiomegaly and possibly vascular congestion.

  Mediastinum:  Unremarkable.

  Bones/joints:  No acute fracture.

 

IMPRESSION:     

  Cardiomegaly and possibly vascular congestion.

## 2019-06-22 NOTE — NUR
ED Nurse Note:

Pt heard retching. Observed pt throwing up yellow, liquid emesis. Pt cleaned 
and changed. Will notify ER MD and continue to monitor.

## 2019-06-22 NOTE — NUR
ED Nurse Note:

Pt refused discharge. Primary ER MD spoke with pt and decided to keep her for 
admission. Will carry out ER MD's orders and wait for registration to process 
to determine whether pt will have to be transferred per insurance. Will 
continue to monitor.

## 2019-06-22 NOTE — DIAGNOSTIC IMAGING REPORT
EXAM:

  CT Abdomen and Pelvis Without Intravenous Contrast

 

CLINICAL HISTORY:

  ABD PAIN

 

TECHNIQUE:

  Axial computed tomography images of the abdomen and pelvis without 

intravenous contrast.  CTDI is 19.51 mGy and DLP is 1055 mGy-cm.  One or 

more of the following dose reduction techniques were used: automated 

exposure control, adjustment of the mA and/or kV according to patient 

size, use of iterative reconstruction technique.

 

COMPARISON:

  No relevant prior studies available.

 

FINDINGS:

  Lung bases:  Unremarkable.

 

 ABDOMEN:

  Liver:  Mild fatty liver.

  Gallbladder and bile ducts:  No calcified stones.  No ductal dilation.

  Pancreas:  Unremarkable.

  Spleen:  Unremarkable.

  Adrenals:  Unremarkable.

  Kidneys and ureters: Bilateral renal hypodensities, largest measures 19 

mm in the right kidney No hydronephrosis or perinephric collection.  

  Stomach and bowel:  Colon is collapsed and not well assessed.  

Correlate clinically if there is any concern for a colitis.

 

 PELVIS:

  Appendix:  No findings to suggest acute appendicitis.

  Bladder:  Unremarkable.

  Reproductive:  Hysterectomy.

 

 ABDOMEN and PELVIS:

  Intraperitoneal space:  Unremarkable.

  Bones/joints:  No acute fracture.

  Soft tissues:  Unremarkable.

  Vasculature:  Unremarkable.  No abdominal aortic aneurysm.

  Lymph nodes:  No enlarged lymph nodes.

 

IMPRESSION:     

  No acute findings.

## 2019-06-23 VITALS — SYSTOLIC BLOOD PRESSURE: 155 MMHG | DIASTOLIC BLOOD PRESSURE: 64 MMHG

## 2019-06-23 VITALS — DIASTOLIC BLOOD PRESSURE: 70 MMHG | SYSTOLIC BLOOD PRESSURE: 144 MMHG

## 2019-06-23 VITALS — SYSTOLIC BLOOD PRESSURE: 144 MMHG | DIASTOLIC BLOOD PRESSURE: 70 MMHG

## 2019-06-23 NOTE — NUR
ED Nurse Note:



Pt cleared by health care Provider for transfer to [facility].  
Transfer instruction packet was given and explained to pt/ambulance personnel 
and personnel verbalized understanding of teachings. All medical deviecs such 
as ID band removed. Stephani SINGH at [facility] is aware there is no IV 
access. Pt is AAO x4 and left with all personal belongings.

## 2019-06-23 NOTE — NUR
ED Nurse Note:

Notified pt of hospital choice for transfer and ETA of 0100 for pickup. Will 
continue to monitor.

## 2019-06-23 NOTE — NUR
ED Nurse Note:

Gave report to Stephani SINGH at [facility]. Gave her ETA of 0100 but 
still awaiting pickup arrival. Will continue to monitor.